# Patient Record
Sex: FEMALE | Race: OTHER
[De-identification: names, ages, dates, MRNs, and addresses within clinical notes are randomized per-mention and may not be internally consistent; named-entity substitution may affect disease eponyms.]

---

## 2018-06-16 ENCOUNTER — HOSPITAL ENCOUNTER (EMERGENCY)
Dept: HOSPITAL 14 - H.ER | Age: 23
Discharge: HOME | End: 2018-06-16
Payer: SELF-PAY

## 2018-06-16 VITALS — HEART RATE: 68 BPM | RESPIRATION RATE: 16 BRPM | OXYGEN SATURATION: 99 %

## 2018-06-16 VITALS — DIASTOLIC BLOOD PRESSURE: 60 MMHG | SYSTOLIC BLOOD PRESSURE: 110 MMHG | TEMPERATURE: 98.5 F

## 2018-06-16 DIAGNOSIS — M79.5: Primary | ICD-10-CM

## 2018-06-16 NOTE — ED PDOC
Lower Extremity Pain/Injury


Time Seen by Provider: 06/16/18 17:00


Chief Complaint (Nursing): Lower Extremity Problem/Injury


Chief Complaint (Provider): Lower extremity problem/injury


History Per: Patient


History/Exam Limitations: no limitations


Onset/Duration Of Symptoms: Hrs (earlier today)


Current Symptoms Are (Timing): Still Present


Additional Complaint(s): 





23 year old female presented to the ED complaining of a right foot injury which 

occurred earlier today.  Patient reports she stepped on a piece of glass with 

her right foot and was unable to remove it.  Tetanus is unknown.





PCP: none provided





Past Medical History


Reviewed: Historical Data, Nursing Documentation, Vital Signs


Vital Signs: 





 Last Vital Signs











Temp  98.6 F   06/16/18 16:57


 


Pulse  68   06/16/18 16:57


 


Resp  16   06/16/18 16:57


 


BP  109/59 L  06/16/18 16:57


 


Pulse Ox  99   06/16/18 16:57














- Medical History


PMH: No Chronic Diseases





- Surgical History


Surgical History: No Surg Hx





- Family History


Family History: States: Unknown Family Hx





- Social History


Current smoker - smoking cessation education provided: No


Alcohol: None


Drugs: Denies





- Immunization History


Hx Tetanus Toxoid Vaccination: Yes


Hx Influenza Vaccination: Yes


Hx Pneumococcal Vaccination: No





- Allergies


Allergies/Adverse Reactions: 


 Allergies











Allergy/AdvReac Type Severity Reaction Status Date / Time


 


pineapple Allergy  RASH Verified 06/16/18 16:57














Review of Systems


ROS Statement: Except As Marked, All Systems Reviewed And Found Negative


Musculoskeletal: Positive for: Other (Right foot injury)





Physical Exam





- Reviewed


Nursing Documentation Reviewed: Yes


Vital Signs Reviewed: Yes





- Physical Exam


Appears: Positive for: Non-toxic, No Acute Distress


Head Exam: Positive for: ATRAUMATIC, NORMOCEPHALIC


Skin: Positive for: Normal Color, Warm, Dry


Eye Exam: Positive for: Normal appearance


Extremity: Positive for: Normal ROM


Neurologic/Psych: Positive for: Alert, Oriented.  Negative for: Motor/Sensory 

Deficits


Comments: 





RIGHT FOOT: Small superficial wound to plantar surface with palpable foreign 

body.  No active bleeding or surrounding erythema.





- Laboratory Results


Urine Pregnancy POC: Negative





- ECG


O2 Sat by Pulse Oximetry: 99 (RA)


Pulse Ox Interpretation: Normal





- Radiology


X-Ray: Interpreted by Me (R foot x-ray)


X-Ray Interpretation: No Acute Disease





- Progress


ED Course And Treament: 





Wound was irrigated with normal saline and foreign body removed with tweezers 

without difficulty.  No bleeding noted.  Wound irrigated again without foreign 

body noted.  





Medical Decision Making


Medical Decision Making: 





Initial Impression: Right foot injury





Initial Plan: 


Right foot X-ray


--------------------------------------------------------------------------------

-----------------


Scribe Attestation:


Documented by Joel Flanagan acting as a scribe for Ike Vargas.





Provider Scribe Attestation:


All medical record entries made by the Scribe were at my direction and 

personally dictated by me. I have reviewed the chart and agree that the record 

accurately reflects my personal performance of the history, physical exam, 

medical decision making, and the department course for this patient. I have 

also personally directed, reviewed, and agree with the discharge instructions 

and disposition.





Disposition





- Clinical Impression


Clinical Impression: 


 Foreign body in subcutaneous tissue








- Patient ED Disposition


Is Patient to be Admitted: No





- Disposition


Referrals: 


[facility] [Outside]


[facility] [Outside]


Disposition: Routine/Home


Disposition Time: 18:36


Condition: STABLE


Additional Instructions: 


Follow up with PMD for further evaluation.


Return to ED immediately if symptoms worsen. 


Instructions:  Foreign Body in Skin (DC)


Forms:  [facility] [facility] (English)

## 2018-06-17 NOTE — RAD
PROCEDURE:  Right Foot Radiographs.



HISTORY:

possible FB  



COMPARISON:

None.



FINDINGS:



BONES:

No acute fracture or destructive bony lesion identified.



JOINTS:

Normal. 



SOFT TISSUES:

Normal. 



OTHER FINDINGS:

None.



IMPRESSION:

Unremarkable right foot radiographs.